# Patient Record
Sex: FEMALE | Race: WHITE | NOT HISPANIC OR LATINO | Employment: UNEMPLOYED | URBAN - METROPOLITAN AREA
[De-identification: names, ages, dates, MRNs, and addresses within clinical notes are randomized per-mention and may not be internally consistent; named-entity substitution may affect disease eponyms.]

---

## 2020-02-10 ENCOUNTER — OFFICE VISIT CONVERTED (OUTPATIENT)
Dept: FAMILY MEDICINE CLINIC | Facility: CLINIC | Age: 55
End: 2020-02-10
Attending: FAMILY MEDICINE

## 2020-02-10 ENCOUNTER — CONVERSION ENCOUNTER (OUTPATIENT)
Dept: FAMILY MEDICINE CLINIC | Facility: CLINIC | Age: 55
End: 2020-02-10

## 2020-02-27 ENCOUNTER — OFFICE VISIT CONVERTED (OUTPATIENT)
Dept: FAMILY MEDICINE CLINIC | Facility: CLINIC | Age: 55
End: 2020-02-27
Attending: NURSE PRACTITIONER

## 2021-05-06 ENCOUNTER — OFFICE VISIT CONVERTED (OUTPATIENT)
Dept: FAMILY MEDICINE CLINIC | Facility: CLINIC | Age: 56
End: 2021-05-06
Attending: NURSE PRACTITIONER

## 2021-05-06 ENCOUNTER — HOSPITAL ENCOUNTER (OUTPATIENT)
Dept: FAMILY MEDICINE CLINIC | Facility: CLINIC | Age: 56
Discharge: HOME | End: 2021-05-06
Attending: NURSE PRACTITIONER

## 2021-05-06 ENCOUNTER — CONVERSION ENCOUNTER (OUTPATIENT)
Dept: FAMILY MEDICINE CLINIC | Facility: CLINIC | Age: 56
End: 2021-05-06

## 2021-05-06 LAB
ALBUMIN SERPL-MCNC: 4.7 G/DL (ref 3.5–5)
ALBUMIN/GLOB SERPL: 1.5 {RATIO} (ref 1.4–2.6)
ALP SERPL-CCNC: 96 U/L (ref 53–141)
ALT SERPL-CCNC: 11 U/L (ref 10–40)
AMYLASE SERPL-CCNC: 49 U/L (ref 30–110)
ANION GAP SERPL CALC-SCNC: 18 MMOL/L (ref 8–19)
AST SERPL-CCNC: 16 U/L (ref 15–50)
BASOPHILS # BLD AUTO: 0.05 10*3/UL (ref 0–0.2)
BASOPHILS NFR BLD AUTO: 0.6 % (ref 0–3)
BILIRUB SERPL-MCNC: 0.24 MG/DL (ref 0.2–1.3)
BUN SERPL-MCNC: 6 MG/DL (ref 5–25)
BUN/CREAT SERPL: 7 {RATIO} (ref 6–20)
CALCIUM SERPL-MCNC: 9.7 MG/DL (ref 8.7–10.4)
CHLORIDE SERPL-SCNC: 96 MMOL/L (ref 99–111)
CONV ABS IMM GRAN: 0.03 10*3/UL (ref 0–0.2)
CONV CO2: 25 MMOL/L (ref 22–32)
CONV IMMATURE GRAN: 0.4 % (ref 0–1.8)
CONV TOTAL PROTEIN: 7.8 G/DL (ref 6.3–8.2)
CREAT UR-MCNC: 0.82 MG/DL (ref 0.5–0.9)
DEPRECATED RDW RBC AUTO: 38.8 FL (ref 36.4–46.3)
EOSINOPHIL # BLD AUTO: 0.1 10*3/UL (ref 0–0.7)
EOSINOPHIL # BLD AUTO: 1.2 % (ref 0–7)
ERYTHROCYTE [DISTWIDTH] IN BLOOD BY AUTOMATED COUNT: 12.2 % (ref 11.7–14.4)
GFR SERPLBLD BASED ON 1.73 SQ M-ARVRAT: >60 ML/MIN/{1.73_M2}
GLOBULIN UR ELPH-MCNC: 3.1 G/DL (ref 2–3.5)
GLUCOSE SERPL-MCNC: 84 MG/DL (ref 65–99)
HCT VFR BLD AUTO: 42.7 % (ref 37–47)
HGB BLD-MCNC: 14.2 G/DL (ref 12–16)
LIPASE SERPL-CCNC: 30 U/L (ref 5–51)
LYMPHOCYTES # BLD AUTO: 1.91 10*3/UL (ref 1–5)
LYMPHOCYTES NFR BLD AUTO: 23.8 % (ref 20–45)
MCH RBC QN AUTO: 28.9 PG (ref 27–31)
MCHC RBC AUTO-ENTMCNC: 33.3 G/DL (ref 33–37)
MCV RBC AUTO: 87 FL (ref 81–99)
MONOCYTES # BLD AUTO: 0.52 10*3/UL (ref 0.2–1.2)
MONOCYTES NFR BLD AUTO: 6.5 % (ref 3–10)
NEUTROPHILS # BLD AUTO: 5.4 10*3/UL (ref 2–8)
NEUTROPHILS NFR BLD AUTO: 67.5 % (ref 30–85)
NRBC CBCN: 0 % (ref 0–0.7)
OSMOLALITY SERPL CALC.SUM OF ELEC: 277 MOSM/KG (ref 273–304)
PLATELET # BLD AUTO: 342 10*3/UL (ref 130–400)
PMV BLD AUTO: 9.9 FL (ref 9.4–12.3)
POTASSIUM SERPL-SCNC: 4.3 MMOL/L (ref 3.5–5.3)
RBC # BLD AUTO: 4.91 10*6/UL (ref 4.2–5.4)
SODIUM SERPL-SCNC: 135 MMOL/L (ref 135–147)
WBC # BLD AUTO: 8.01 10*3/UL (ref 4.8–10.8)

## 2021-05-09 VITALS
OXYGEN SATURATION: 98 % | HEART RATE: 118 BPM | DIASTOLIC BLOOD PRESSURE: 84 MMHG | WEIGHT: 149 LBS | TEMPERATURE: 97 F | SYSTOLIC BLOOD PRESSURE: 130 MMHG

## 2021-05-09 VITALS
DIASTOLIC BLOOD PRESSURE: 62 MMHG | SYSTOLIC BLOOD PRESSURE: 124 MMHG | WEIGHT: 147.12 LBS | HEIGHT: 62 IN | BODY MASS INDEX: 27.07 KG/M2 | HEART RATE: 102 BPM | TEMPERATURE: 98.2 F | OXYGEN SATURATION: 97 %

## 2021-05-12 ENCOUNTER — HOSPITAL ENCOUNTER (OUTPATIENT)
Dept: OTHER | Facility: HOSPITAL | Age: 56
Discharge: HOME | End: 2021-05-12
Attending: NURSE PRACTITIONER

## 2021-05-23 ENCOUNTER — TRANSCRIBE ORDERS (OUTPATIENT)
Dept: ADMINISTRATIVE | Facility: HOSPITAL | Age: 56
End: 2021-05-23

## 2021-05-23 DIAGNOSIS — K82.8 BILIARY DYSKINESIA: ICD-10-CM

## 2021-05-23 DIAGNOSIS — R10.9 RIGHT LATERAL ABDOMINAL PAIN: Primary | ICD-10-CM

## 2021-06-05 NOTE — PROGRESS NOTES
Progress Note      Patient Name: Urszula Goodwin   Patient ID: [id number]   Sex: Female   YOB: 1965    Referring Provider: Yanira CALDERON    Visit Date: May 6, 2021    Provider: YUMIKO Mckeon   Location: [facility]   Location Address: [address] [place] [address] [address] [place], [place]  [address]   Location Phone: [phone]          Chief Complaint     Right mid abdomen/back pain x four weeks.       History Of Present Illness  Urszula Goodwin is a 56 year old /White female who presents for evaluation and treatment of:      right middle abdominal and right back pain x 4 weeks - pain in the abdomen and shoot toward the back - originally thought it a pulled muscle but then noted worse after eating a large meal - eating only soup this past week - when eats a meal has difficulty turning to the right due to pain - stool is soft, her normal, drinks lots of decaf coffee - denies pain in right shoulder or shoulder blade           Past Medical History  Disease Name Date Onset Notes   Alcohol abuse --  --    Seasonal allergies --  --          Medication List  Name Date Started Instructions   Flonase Allergy Relief 50 mcg/actuation nasal spray,suspension  spray 1 spray (50 mcg) in each nostril by intranasal route once daily   Zyrtec 10 mg oral capsule  1 QD         Allergy List  Allergen Name Date Reaction Notes   NO KNOWN DRUG ALLERGIES --  --  --          Family Medical History  Disease Name Relative/Age Notes   DM Type II Brother/  Father/   Father; Brother   DM Type I Father/   Father   Hyperlipidemia Father/   Father   Arthrtis Grandmother (paternal)/   Grandmother (paternal)   Alcohol abuse Brother/   Brother         Social History  Finding Status Start/Stop Quantity Notes   Alcohol Current every day --/-- --  drinks alcohol, 7 or less drinks per week   Exercises regularly --  --/-- --  occasionally   Recreational Drug Use Never --/-- --  never used   Second hand smoke exposure  Unknown --/-- --  yes   Tobacco Current every day --/-- 1.5- 2 PPD current every day smoker, smokes 1 to 2 packs per day, for 25 years, never uses other tobacco products   Uses seatbelts --  --/-- --  yes         Review of Systems  · Constitutional  o Denies  o : fever, chills, body aches  · Cardiovascular  o Denies  o : chest pain, palpitations  · Respiratory  o Denies  o : shortness of breath, wheezing, cough  · Gastrointestinal  o Admits  o : excessive belching, excessive flatulence, bloating  o Denies  o : nausea, vomiting, diarrhea  · Allergic-Immunologic  o Admits  o : sinus allergy symptoms      Vitals  Date Time BP Position Site L\R Cuff Size HR RR TEMP (F) WT  HT  BMI kg/m2 BSA m2 O2 Sat FR L/min FiO2 HC       05/06/2021 01:08 /89 Sitting    79 - R  97.1 147lbs 16oz    96 %            Physical Examination  · Constitutional  o Appearance  o : well developed, well-nourished, in no acute distress  · Eyes  o Conjunctivae  o : conjunctivae normal  · Neck  o Inspection/Palpation  o : supple  o Thyroid  o : no thyromegaly  · Respiratory  o Respiratory Effort  o : breathing unlabored  o Auscultation of Lungs  o : clear to ascultation  · Cardiovascular  o Heart  o :   § Auscultation of Heart  § : regular rate and rhythm  o Peripheral Vascular System  o :   § Extremities  § : no edema  · Gastrointestinal  o Abdominal Examination  o :   § Abdomen  § : bowel sounds present, non-distended, non-tender, negative rebound tenderness, mild right flank discomfort with percussion  · Lymphatic  o Neck  o : no lymphadenopathy present  · Musculoskeletal  o General  o :   § General Musculoskeletal  § : Muscle tone, strength, and development grossly normal.  · Skin and Subcutaneous Tissue  o General Inspection  o : NL tone  · Neurologic  o Gait and Station  o :   § Gait Screening  § : normal gait  · Psychiatric  o Mood and Affect  o : mood normal, affect appropriate              Assessment  · Abdominal  pain     789.00/R10.9  · Flank pain     789.09/R10.9      Plan  · Orders  o Amylase and lipase panel (62314, 37617) - 789.00/R10.9 - 05/06/2021  o CBC with Auto Diff [facility] (10753) - 789.00/R10.9 - 05/06/2021  o CMP [facility] (81437) - 789.00/R10.9 - 05/06/2021  o Gallbladder U/S (30046) - 789.00/R10.9 - 05/06/2021   RUQ pain and Right flank pain include kidney if possible  o IOP - Urinalysis without Microscopy (Clinitek) [facility] (51350) - 789.00/R10.9, 789.09/R10.9 - 05/06/2021   glu neg, elie small, ket 15mg, blo neg, ph 6.0, pro 30mg, uro 1.0, nit neg, leuk neg  o ACO-39: Current medications updated and reviewed (1159F, ) - - 05/06/2021  · Medications  o Zyrtec-D 5-120 mg oral tablet extended release 12 hr   SIG: take 1 tablet by oral route every 12 hours for 30 days   DISP: (60) Tablet with 1 refills  Prescribed on 05/06/2021     o Medications have been Reconciled  o Transition of Care or Provider Policy  · Instructions  o Instructed to seek medical attention urgently for new or worsening symptoms.  o Patient was educated/instructed on their diagnosis, treatment and medications prior to discharge from the clinic today.            Electronically Signed by: YUMIKO Mckeon -Author on May 7, 2021 11:07:11 AM

## 2021-06-14 ENCOUNTER — HOSPITAL ENCOUNTER (OUTPATIENT)
Dept: NUCLEAR MEDICINE | Facility: HOSPITAL | Age: 56
Discharge: HOME | End: 2021-06-14
Admitting: NURSE PRACTITIONER

## 2021-06-14 DIAGNOSIS — R10.9 RIGHT LATERAL ABDOMINAL PAIN: ICD-10-CM

## 2021-06-14 PROCEDURE — 78227 HEPATOBIL SYST IMAGE W/DRUG: CPT

## 2021-06-14 PROCEDURE — 0 TECHNETIUM TC 99M MEBROFENIN KIT: Performed by: NURSE PRACTITIONER

## 2021-06-14 PROCEDURE — A9537 TC99M MEBROFENIN: HCPCS | Performed by: NURSE PRACTITIONER

## 2021-06-14 RX ORDER — KIT FOR THE PREPARATION OF TECHNETIUM TC 99M MEBROFENIN 45 MG/10ML
1 INJECTION, POWDER, LYOPHILIZED, FOR SOLUTION INTRAVENOUS
Status: COMPLETED | OUTPATIENT
Start: 2021-06-14 | End: 2021-06-14

## 2021-06-14 RX ADMIN — MEBROFENIN 1 DOSE: 45 INJECTION, POWDER, LYOPHILIZED, FOR SOLUTION INTRAVENOUS at 08:55

## 2021-07-15 VITALS
OXYGEN SATURATION: 96 % | SYSTOLIC BLOOD PRESSURE: 128 MMHG | DIASTOLIC BLOOD PRESSURE: 89 MMHG | TEMPERATURE: 97.1 F | BODY MASS INDEX: 27.07 KG/M2 | WEIGHT: 148 LBS | HEART RATE: 79 BPM

## 2021-07-20 ENCOUNTER — OFFICE VISIT (OUTPATIENT)
Dept: SURGERY | Facility: CLINIC | Age: 56
End: 2021-07-20

## 2021-07-20 VITALS — WEIGHT: 140.2 LBS | BODY MASS INDEX: 24.84 KG/M2 | RESPIRATION RATE: 16 BRPM | HEIGHT: 63 IN

## 2021-07-20 DIAGNOSIS — K82.8 BILIARY DYSKINESIA: Primary | ICD-10-CM

## 2021-07-20 PROCEDURE — 99213 OFFICE O/P EST LOW 20 MIN: CPT | Performed by: NURSE PRACTITIONER

## 2021-07-20 RX ORDER — TOBRAMYCIN AND DEXAMETHASONE 3; 1 MG/ML; MG/ML
1 SUSPENSION/ DROPS OPHTHALMIC
COMMUNITY

## 2021-07-20 RX ORDER — CETIRIZINE HYDROCHLORIDE 10 MG/1
CAPSULE, LIQUID FILLED ORAL
COMMUNITY

## 2021-07-20 RX ORDER — FLUTICASONE PROPIONATE 50 MCG
SPRAY, SUSPENSION (ML) NASAL
COMMUNITY

## 2021-07-20 NOTE — PROGRESS NOTES
Chief Complaint  Abdominal Pain    Subjective          Urszula Goodwin presents to [facility]  Patient is a 56-year-old white/ female that presents to general surgery office at the request of Eryn Xiong.    Patient reports back in early May she was with some right upper quadrant pain that radiates through to her back that was not affected by food.     Reported she had diarrhea immediately after eating.    Today she denies any pain at all.    PROCEDURE: ULTRASOUND, ABDOMEN COMPLETE         COMPARISON: None         INDICATIONS: ABD PAIN         TECHNIQUE: High resolution sonographic examination of the abdomen was performed.           FINDINGS:     LIVER: Normal.  Normal size and echotexture.  No significant masses.    BILIARY: Normal.  Normal appearing gallbladder and biliary tree.      PANCREAS: Normal.  No visible mass, abnormal atrophy, or ductal dilatation.      SPLEEN: Normal.  Normal size and echotexture.      KIDNEYS: Normal.  No mass or obstruction.      AORTA/VASCULAR: Normal.  No aneurysm.      OTHER: Negative.           CONCLUSION: Normal examination.            JESE MCKEON MD                 PROCEDURE:  NM HIDA SCAN WITH PHARMACOLOGICAL INTERVENTION     COMPARISON: [facility], US, US ABDOMEN COMPLETE, 5/12/2021, 10:24.     INDICATIONS:  RIGHT ABD PAIN     TECHNIQUE:    Informed consent was obtained. A routine radionuclide hepatobiliary scan was performed   after intravenous injection of radiopharmaceutical Tc-99 JACINTO derivative with sequential   acquisitions every 5 minutes for one hour. Then, a repeat hepatobiliary scan with gallbladder   ejection fraction analysis was performed after an 8 ounce can of Ensure was ingested orally.      RADIONUCLIDE:         5.2 mCi    Tc99m Mebrofenin - I.V.     FINDINGS:          BILIARY DUCTS:           Normal radioisotopic biliary excretion.    GALLBLADDER:            Normal with no  "evidence of cystic duct obstruction.    INTESTINE:       Normal with no evidence of common biliary ductal obstruction.    EJECTION FRACTION: The gallbladder was difficult to visualize following administration of Ensure to   assess gallbladder contraction.  Activity in the right upper quadrant resulted in calculation of   ejection fraction of 10%, although this could be falsely low due to activity within right upper   quadrant bowel loops.  OTHER:             Symptoms were not reproduced with evaluation of gallbladder ejection fraction.       CONCLUSION:   1. Gallbladder ejection fraction was calculated at 10% which is abnormal, and could indicate   biliary dyskinesia, however, the gallbladder was difficult to visualize on images for ejection   fraction calculation, and the ejection fraction may be falsely decreased due to activity in right   upper quadrant bowel loops.  Recommend further correlation with clinical findings of biliary   pathology.  2. Normal hepatobiliary scan excludes acute cholecystitis or cystic duct obstruction.  3. Symptoms were not reproduced on this exam.            GABRIELLE TATUM MD             Objective   Vital Signs:   Resp 16   Ht 160 cm (63\")   Wt 63.6 kg (140 lb 3.2 oz)   BMI 24.84 kg/m²     Physical Exam  Constitutional:       Appearance: Normal appearance.   Pulmonary:      Effort: Pulmonary effort is normal.   Abdominal:      General: Abdomen is flat.      Tenderness: There is abdominal tenderness.   Skin:     General: Skin is warm and dry.   Neurological:      General: No focal deficit present.      Mental Status: She is alert and oriented to person, place, and time.   Psychiatric:         Mood and Affect: Mood normal.         Behavior: Behavior normal.        Result Review :                 Assessment and Plan      Patient wishes to hold off on a laparoscopic cholecystectomy at this time.  She reports that she does not have any pain.  I have educated her she may start to encounter " gallbladder attacks frequently.  If and when she changes her mind she can notify the office and we will get her set up.      Diagnoses and all orders for this visit:    1. Biliary dyskinesia (Primary)        Follow Up   Return if symptoms worsen or fail to improve.     Patient was given instructions and counseling regarding her condition or for health maintenance advice. Please see specific information pulled into the AVS if appropriate.

## 2023-10-12 ENCOUNTER — APPOINTMENT (OUTPATIENT)
Dept: CT IMAGING | Facility: HOSPITAL | Age: 58
End: 2023-10-12
Payer: OTHER GOVERNMENT

## 2023-10-12 ENCOUNTER — HOSPITAL ENCOUNTER (EMERGENCY)
Facility: HOSPITAL | Age: 58
Discharge: HOME | End: 2023-10-12
Attending: EMERGENCY MEDICINE
Payer: OTHER GOVERNMENT

## 2023-10-12 VITALS
HEIGHT: 63 IN | BODY MASS INDEX: 22.89 KG/M2 | SYSTOLIC BLOOD PRESSURE: 96 MMHG | DIASTOLIC BLOOD PRESSURE: 63 MMHG | RESPIRATION RATE: 18 BRPM | TEMPERATURE: 98.6 F | WEIGHT: 129.19 LBS | HEART RATE: 76 BPM | OXYGEN SATURATION: 97 %

## 2023-10-12 DIAGNOSIS — K29.00 ACUTE GASTRITIS WITHOUT HEMORRHAGE, UNSPECIFIED GASTRITIS TYPE: Primary | ICD-10-CM

## 2023-10-12 LAB
ALBUMIN SERPL-MCNC: 5 G/DL (ref 3.5–5.2)
ALBUMIN/GLOB SERPL: 1.9 G/DL
ALP SERPL-CCNC: 87 U/L (ref 39–117)
ALT SERPL W P-5'-P-CCNC: 18 U/L (ref 1–33)
ANION GAP SERPL CALCULATED.3IONS-SCNC: 12.4 MMOL/L (ref 5–15)
AST SERPL-CCNC: 24 U/L (ref 1–32)
BACTERIA UR QL AUTO: NORMAL /HPF
BASOPHILS # BLD AUTO: 0.04 10*3/MM3 (ref 0–0.2)
BASOPHILS NFR BLD AUTO: 1.1 % (ref 0–1.5)
BILIRUB SERPL-MCNC: 0.2 MG/DL (ref 0–1.2)
BILIRUB UR QL STRIP: NEGATIVE
BUN SERPL-MCNC: 8 MG/DL (ref 6–20)
BUN/CREAT SERPL: 11 (ref 7–25)
CALCIUM SPEC-SCNC: 9.7 MG/DL (ref 8.6–10.5)
CHLORIDE SERPL-SCNC: 97 MMOL/L (ref 98–107)
CLARITY UR: CLEAR
CO2 SERPL-SCNC: 24.6 MMOL/L (ref 22–29)
COLOR UR: YELLOW
CREAT SERPL-MCNC: 0.73 MG/DL (ref 0.57–1)
D-LACTATE SERPL-SCNC: 1 MMOL/L (ref 0.5–2)
DEPRECATED RDW RBC AUTO: 39.3 FL (ref 37–54)
EGFRCR SERPLBLD CKD-EPI 2021: 96.1 ML/MIN/1.73
EOSINOPHIL # BLD AUTO: 0.04 10*3/MM3 (ref 0–0.4)
EOSINOPHIL NFR BLD AUTO: 1.1 % (ref 0.3–6.2)
ERYTHROCYTE [DISTWIDTH] IN BLOOD BY AUTOMATED COUNT: 12.3 % (ref 12.3–15.4)
GLOBULIN UR ELPH-MCNC: 2.6 GM/DL
GLUCOSE SERPL-MCNC: 93 MG/DL (ref 65–99)
GLUCOSE UR STRIP-MCNC: NEGATIVE MG/DL
HCT VFR BLD AUTO: 46.5 % (ref 34–46.6)
HGB BLD-MCNC: 15.7 G/DL (ref 12–15.9)
HGB UR QL STRIP.AUTO: ABNORMAL
HOLD SPECIMEN: NORMAL
HOLD SPECIMEN: NORMAL
HYALINE CASTS UR QL AUTO: NORMAL /LPF
IMM GRANULOCYTES # BLD AUTO: 0.01 10*3/MM3 (ref 0–0.05)
IMM GRANULOCYTES NFR BLD AUTO: 0.3 % (ref 0–0.5)
KETONES UR QL STRIP: ABNORMAL
LEUKOCYTE ESTERASE UR QL STRIP.AUTO: NEGATIVE
LIPASE SERPL-CCNC: 57 U/L (ref 13–60)
LYMPHOCYTES # BLD AUTO: 0.92 10*3/MM3 (ref 0.7–3.1)
LYMPHOCYTES NFR BLD AUTO: 24.2 % (ref 19.6–45.3)
MCH RBC QN AUTO: 29.3 PG (ref 26.6–33)
MCHC RBC AUTO-ENTMCNC: 33.8 G/DL (ref 31.5–35.7)
MCV RBC AUTO: 86.8 FL (ref 79–97)
MONOCYTES # BLD AUTO: 0.31 10*3/MM3 (ref 0.1–0.9)
MONOCYTES NFR BLD AUTO: 8.2 % (ref 5–12)
NEUTROPHILS NFR BLD AUTO: 2.48 10*3/MM3 (ref 1.7–7)
NEUTROPHILS NFR BLD AUTO: 65.1 % (ref 42.7–76)
NITRITE UR QL STRIP: NEGATIVE
NRBC BLD AUTO-RTO: 0 /100 WBC (ref 0–0.2)
PH UR STRIP.AUTO: 5.5 [PH] (ref 5–8)
PLATELET # BLD AUTO: 188 10*3/MM3 (ref 140–450)
PMV BLD AUTO: 10.4 FL (ref 6–12)
POTASSIUM SERPL-SCNC: 4.4 MMOL/L (ref 3.5–5.2)
PROT SERPL-MCNC: 7.6 G/DL (ref 6–8.5)
PROT UR QL STRIP: ABNORMAL
RBC # BLD AUTO: 5.36 10*6/MM3 (ref 3.77–5.28)
RBC # UR STRIP: NORMAL /HPF
REF LAB TEST METHOD: NORMAL
SODIUM SERPL-SCNC: 134 MMOL/L (ref 136–145)
SP GR UR STRIP: 1.01 (ref 1–1.03)
SQUAMOUS #/AREA URNS HPF: NORMAL /HPF
UROBILINOGEN UR QL STRIP: ABNORMAL
WBC # UR STRIP: NORMAL /HPF
WBC NRBC COR # BLD: 3.8 10*3/MM3 (ref 3.4–10.8)
WHOLE BLOOD HOLD COAG: NORMAL
WHOLE BLOOD HOLD SPECIMEN: NORMAL

## 2023-10-12 PROCEDURE — 81001 URINALYSIS AUTO W/SCOPE: CPT

## 2023-10-12 PROCEDURE — 83605 ASSAY OF LACTIC ACID: CPT

## 2023-10-12 PROCEDURE — 25010000002 ONDANSETRON PER 1 MG: Performed by: EMERGENCY MEDICINE

## 2023-10-12 PROCEDURE — 85025 COMPLETE CBC W/AUTO DIFF WBC: CPT

## 2023-10-12 PROCEDURE — 25510000001 IOPAMIDOL PER 1 ML: Performed by: EMERGENCY MEDICINE

## 2023-10-12 PROCEDURE — 99285 EMERGENCY DEPT VISIT HI MDM: CPT

## 2023-10-12 PROCEDURE — 25010000002 MORPHINE PER 10 MG: Performed by: EMERGENCY MEDICINE

## 2023-10-12 PROCEDURE — 96375 TX/PRO/DX INJ NEW DRUG ADDON: CPT

## 2023-10-12 PROCEDURE — 83690 ASSAY OF LIPASE: CPT

## 2023-10-12 PROCEDURE — 80053 COMPREHEN METABOLIC PANEL: CPT

## 2023-10-12 PROCEDURE — 96374 THER/PROPH/DIAG INJ IV PUSH: CPT

## 2023-10-12 PROCEDURE — 74177 CT ABD & PELVIS W/CONTRAST: CPT

## 2023-10-12 PROCEDURE — 36415 COLL VENOUS BLD VENIPUNCTURE: CPT

## 2023-10-12 RX ORDER — ONDANSETRON 2 MG/ML
4 INJECTION INTRAMUSCULAR; INTRAVENOUS ONCE
Status: COMPLETED | OUTPATIENT
Start: 2023-10-12 | End: 2023-10-12

## 2023-10-12 RX ORDER — SODIUM CHLORIDE 0.9 % (FLUSH) 0.9 %
10 SYRINGE (ML) INJECTION AS NEEDED
Status: DISCONTINUED | OUTPATIENT
Start: 2023-10-12 | End: 2023-10-12 | Stop reason: HOSPADM

## 2023-10-12 RX ADMIN — MORPHINE SULFATE 4 MG: 4 INJECTION, SOLUTION INTRAMUSCULAR; INTRAVENOUS at 18:52

## 2023-10-12 RX ADMIN — ONDANSETRON 4 MG: 2 INJECTION INTRAMUSCULAR; INTRAVENOUS at 18:52

## 2023-10-12 RX ADMIN — IOPAMIDOL 75 ML: 755 INJECTION, SOLUTION INTRAVENOUS at 19:42

## 2023-10-12 NOTE — ED PROVIDER NOTES
Time: 6:09 PM EDT  Date of encounter:  10/12/2023  Independent Historian/Clinical History and Information was obtained by:   Patient    History is limited by: N/A    Chief Complaint: Abdominal pain      History of Present Illness:  Patient is a 58 y.o. year old female who presents to the emergency department for evaluation of abdominal pain.  Patient states that this week she was diagnosed with the flu and then today she began having right upper quadrant abdominal pain.  Patient states the pain radiates from the right upper quadrant to her back.  Patient states she has been told in the past that her gallbladder function is low.  Patient states she was scheduled to have cholecystectomy but at the time that was scheduled she was not having pain any longer so she did not have it done.  Patient states today she had nausea and vomiting.  Patient denies fever, vomiting, dysuria, chest pain, shortness of breath.    HPI    Patient Care Team  Primary Care Provider: Rachel Kessler APRN    Past Medical History:     No Known Allergies  History reviewed. No pertinent past medical history.  History reviewed. No pertinent surgical history.  History reviewed. No pertinent family history.    Home Medications:  Prior to Admission medications    Medication Sig Start Date End Date Taking? Authorizing Provider   Cetirizine HCl (ZyrTEC Allergy) 10 MG capsule Zyrtec 10 mg oral capsule 1 QD   Active   Yes ProviderCarloz MD   Cetirizine-Pseudoephedrine (WAL-ZYR D PO) TAKE ONE TABLET BY MOUTH EVERY 12 HOURS  Patient not taking: Reported on 10/12/2023 5/6/21   Carloz Barlow MD   fluticasone (Flonase) 50 MCG/ACT nasal spray Flonase Allergy Relief 50 mcg/actuation nasal spray,suspension spray 1 spray (50 mcg) in each nostril by intranasal route once daily   Active  Patient not taking: Reported on 10/12/2023    ProviderCarloz MD   tobramycin-dexamethasone (TOBRADEX) 0.3-0.1 % ophthalmic suspension 1 drop.  Patient  "not taking: Reported on 10/12/2023    Provider, MD Carloz        Social History:   Social History     Tobacco Use    Smoking status: Every Day     Packs/day: 1.5     Types: Cigarettes    Smokeless tobacco: Never   Substance Use Topics    Alcohol use: Not Currently         Review of Systems:  Review of Systems   Constitutional:  Negative for fever.   Respiratory:  Negative for shortness of breath.    Cardiovascular:  Negative for chest pain.   Gastrointestinal:  Positive for abdominal pain, diarrhea and nausea. Negative for vomiting.   Genitourinary:  Negative for dysuria.        Physical Exam:  BP 96/63   Pulse 76   Temp 98.6 øF (37 øC)   Resp 18   Ht 160 cm (63\")   Wt 58.6 kg (129 lb 3 oz)   SpO2 97%   BMI 22.88 kg/mý     Physical Exam  Vitals and nursing note reviewed.   Constitutional:       General: She is not in acute distress.     Appearance: Normal appearance. She is well-developed and normal weight. She is not ill-appearing, toxic-appearing or diaphoretic.   HENT:      Head: Normocephalic and atraumatic.      Nose: Nose normal.   Eyes:      Extraocular Movements: Extraocular movements intact.      Conjunctiva/sclera: Conjunctivae normal.      Pupils: Pupils are equal, round, and reactive to light.   Cardiovascular:      Rate and Rhythm: Normal rate and regular rhythm.      Heart sounds: Normal heart sounds.   Pulmonary:      Effort: Pulmonary effort is normal.      Breath sounds: Normal breath sounds.   Abdominal:      General: Abdomen is flat. Bowel sounds are normal. There is no distension.      Palpations: Abdomen is soft. There is no hepatomegaly, splenomegaly, mass or pulsatile mass.      Tenderness: There is abdominal tenderness in the right upper quadrant. There is no guarding or rebound. Negative signs include Millan's sign and McBurney's sign.      Hernia: No hernia is present.   Musculoskeletal:         General: Normal range of motion.      Cervical back: Normal range of motion and neck " supple.   Skin:     General: Skin is warm and dry.   Neurological:      General: No focal deficit present.      Mental Status: She is alert and oriented to person, place, and time.   Psychiatric:         Mood and Affect: Mood normal.         Behavior: Behavior normal.         Thought Content: Thought content normal.         Judgment: Judgment normal.                Procedures:  Procedures      Medical Decision Making:    Comorbidities that affect care:    None    External Notes reviewed:    Previous Clinic Note: General surgery office visit from 7- where patient was diagnosed with biliary dyskinesia after HIDA scan showing function of 10%      The following orders were placed and all results were independently analyzed by me:  Orders Placed This Encounter   Procedures    CT Abdomen Pelvis With Contrast    [place] Draw    Comprehensive Metabolic Panel    Lipase    Urinalysis With Microscopic If Indicated (No Culture) - Urine, Clean Catch    Lactic Acid, Plasma    CBC Auto Differential    Urinalysis, Microscopic Only - Urine, Clean Catch    NPO Diet NPO Type: Strict NPO    Undress & Gown    Insert Peripheral IV    CBC & Differential    Green Top (Gel)    Lavender Top    Gold Top - SST    Light Blue Top       Medications Given in the Emergency Department:  Medications   sodium chloride 0.9 % flush 10 mL (has no administration in time range)   morphine injection 4 mg (4 mg Intravenous Given 10/12/23 1852)   ondansetron (ZOFRAN) injection 4 mg (4 mg Intravenous Given 10/12/23 1852)   iopamidol (ISOVUE-370) 76 % injection 100 mL (75 mL Intravenous Given 10/12/23 1942)        ED Course:    ED Course as of 10/12/23 2137   Thu Oct 12, 2023   2123 CT Abdomen Pelvis With Contrast  1. Mild prominence of the gastric thickness and mucosal enhancement.  Findings may be artifactual   or related underlying gastritis.  Please correlate clinically  2. Otherwise unremarkable study [MD]      ED Course User Index  [MD] Boy  James OWEN PA-C       Labs:    Lab Results (last 24 hours)       Procedure Component Value Units Date/Time    CBC & Differential [[id number]]  (Abnormal) Collected: 10/12/23 1752    Specimen: Blood Updated: 10/12/23 1811    Narrative:      The following orders were created for panel order CBC & Differential.  Procedure                               Abnormality         Status                     ---------                               -----------         ------                     CBC Auto Differential[[id number]]        Abnormal            Final result                 Please view results for these tests on the individual orders.    Comprehensive Metabolic Panel [[id number]]  (Abnormal) Collected: 10/12/23 1752    Specimen: Blood Updated: 10/12/23 1836     Glucose 93 mg/dL      BUN 8 mg/dL      Creatinine 0.73 mg/dL      Sodium 134 mmol/L      Potassium 4.4 mmol/L      Chloride 97 mmol/L      CO2 24.6 mmol/L      Calcium 9.7 mg/dL      Total Protein 7.6 g/dL      Albumin 5.0 g/dL      ALT (SGPT) 18 U/L      AST (SGOT) 24 U/L      Alkaline Phosphatase 87 U/L      Total Bilirubin 0.2 mg/dL      Globulin 2.6 gm/dL      A/G Ratio 1.9 g/dL      BUN/Creatinine Ratio 11.0     Anion Gap 12.4 mmol/L      eGFR 96.1 mL/min/1.73     Narrative:      GFR Normal >60  Chronic Kidney Disease <60  Kidney Failure <15      Lipase [[id number]]  (Normal) Collected: 10/12/23 1752    Specimen: Blood Updated: 10/12/23 1836     Lipase 57 U/L     Lactic Acid, Plasma [[id number]]  (Normal) Collected: 10/12/23 1752    Specimen: Blood Updated: 10/12/23 1826     Lactate 1.0 mmol/L     CBC Auto Differential [[id number]]  (Abnormal) Collected: 10/12/23 1752    Specimen: Blood Updated: 10/12/23 1811     WBC 3.80 10*3/mm3      RBC 5.36 10*6/mm3      Hemoglobin 15.7 g/dL      Hematocrit 46.5 %      MCV 86.8 fL      MCH 29.3 pg      MCHC 33.8 g/dL      RDW 12.3 %      RDW-SD 39.3 fl      MPV 10.4 fL      Platelets 188 10*3/mm3      Neutrophil % 65.1 %       Lymphocyte % 24.2 %      Monocyte % 8.2 %      Eosinophil % 1.1 %      Basophil % 1.1 %      Immature Grans % 0.3 %      Neutrophils, Absolute 2.48 10*3/mm3      Lymphocytes, Absolute 0.92 10*3/mm3      Monocytes, Absolute 0.31 10*3/mm3      Eosinophils, Absolute 0.04 10*3/mm3      Basophils, Absolute 0.04 10*3/mm3      Immature Grans, Absolute 0.01 10*3/mm3      nRBC 0.0 /100 WBC     Urinalysis With Microscopic If Indicated (No Culture) - Urine, Clean Catch [[id number]]  (Abnormal) Collected: 10/12/23 1843    Specimen: Urine, Clean Catch Updated: 10/12/23 1926     Color, UA Yellow     Appearance, UA Clear     pH, UA 5.5     Specific Gravity, UA 1.011     Glucose, UA Negative     Ketones, UA Trace     Bilirubin, UA Negative     Blood, UA Trace     Protein, UA Trace     Leuk Esterase, UA Negative     Nitrite, UA Negative     Urobilinogen, UA 0.2 E.U./dL    Urinalysis, Microscopic Only - Urine, Clean Catch [[id number]] Collected: 10/12/23 1843    Specimen: Urine, Clean Catch Updated: 10/12/23 1926     RBC, UA 0-2 /HPF      WBC, UA 0-2 /HPF      Bacteria, UA None Seen /HPF      Squamous Epithelial Cells, UA 0-2 /HPF      Hyaline Casts, UA 0-2 /LPF      Methodology Automated Microscopy             Imaging:    CT Abdomen Pelvis With Contrast    Result Date: 10/12/2023  PROCEDURE: CT ABDOMEN PELVIS W CONTRAST  COMPARISON: None  INDICATIONS: RIGHT UPPER ABDOMINAL PAIN X TODAY  TECHNIQUE: After obtaining the patient's consent, CT images were created with non-ionic intravenous contrast material.   PROTOCOL:   Standard imaging protocol performed    RADIATION:   DLP: 333 mGy*cm   Automated exposure control was utilized to minimize radiation dose. CONTRAST: 75 cc Isovue 370 I.V. LABS:   eGFR: 96.1 ml/min/1.73m2  FINDINGS:    Lung bases:  Emphysematous changes are noted at the lung bases.  There is evidence of old granulomatous disease with a calcified granuloma at the right base.  No free air is noted below the diaphragm.   Organs:  The liver, gallbladder, spleen, pancreas, kidneys and adrenal glands are grossly unremarkable in appearance.  GI tract:  Mild prominence of the enhancement in thickness of the gastric mucosa which could be artifactual or secondary to underlying gastritis.  GI tract is otherwise unremarkable in appearance.  No suspicious mesenteric adenopathy or fluid collection noted.  Pelvis:  Bladder is incompletely distended and grossly unremarkable in appearance.  Uterus is grossly unremarkable in appearance.  Mild prominence of the pelvic vascularity is noted which can be seen with pelvic congestion syndrome.  Ovaries are not well visualized but appear to be grossly unremarkable in appearance  Retroperitoneum:  Atherosclerotic changes are noted of the aorta which is normal in caliber.  There is no suspicious retroperitoneal adenopathy  Bones and soft tissues:  Multilevel degenerative changes are noted of the spine.  Grade 1 anterolisthesis of L4 on L5 noted with loss of disc space height and mild degenerative change.  No destructive bone lesion is noted.        1. Mild prominence of the gastric thickness and mucosal enhancement.  Findings may be artifactual or related underlying gastritis.  Please correlate clinically 2. Otherwise unremarkable study     LINDA RIVER MD       Electronically Signed and Approved By: LINDA RIVER MD on 10/12/2023 at 21:19                Differential Diagnosis and Discussion:    Abdominal Pain: Based on the patient's signs and symptoms, I considered abdominal aortic aneurysm, small bowel obstruction, pancreatitis, acute cholecystitis, acute appendecitis, peptic ulcer disease, gastritis, colitis, endocrine disorders, irritable bowel syndrome and other differential diagnosis an etiology of the patient's abdominal pain.    All labs were reviewed and interpreted by me.  CT scan radiology impression was interpreted by me.    MDM  Number of Diagnoses or Management Options  Acute gastritis  without hemorrhage, unspecified gastritis type  Diagnosis management comments: Patient presented to the emergency department today for evaluation of right upper quadrant abdominal pain.  CBC has normal white blood cell count.  CMP does note mildly decreased sodium of 130.  Lipase unremarkable.  Urinalysis negative for acute UTI.  CT abdomen pelvis notes possible acute gastritis but no other acute findings.  Did discuss these results with patient and she does not want treatment for this as she believes that this may be related to her current diagnosis of influenza.  I did discuss with patient that no provided the number for general surgery due to previous HIDA scan and current symptoms.       Amount and/or Complexity of Data Reviewed  Clinical lab tests: reviewed and ordered  Tests in the radiology section of [facility]: reviewed and ordered    Risk of Complications, Morbidity, and/or Mortality  Presenting problems: moderate  Diagnostic procedures: moderate  Management options: low    Patient Progress  Patient progress: stable       Consultants/Shared Management Plan:    None    Social Determinants of Health:    Patient is independent, reliable, and has access to care.       Disposition and Care Coordination:    Discharged: The patient is suitable and stable for discharge with no need for consideration of observation or admission.    I have explained the patient's condition, diagnoses and treatment plan based on the information available to me at this time. I have answered questions and addressed any concerns. The patient has a good  understanding of the patient's diagnosis, condition, and treatment plan as can be expected at this point. The vital signs have been stable. The patient's condition is stable and appropriate for discharge from the emergency department.      The patient will pursue further outpatient evaluation with the primary care physician or other designated or consulting physician as outlined in the  discharge instructions. They are agreeable to this plan of care and follow-up instructions have been explained in detail. The patient has received these instructions in written format and have expressed an understanding of the discharge instructions. The patient is aware that any significant change in condition or worsening of symptoms should prompt an immediate return to this or the closest emergency department or call to 911.  I have explained discharge medications and the need for follow up with the patient/caretakers. This was also printed in the discharge instructions. Patient was discharged with the following medications and follow up:      Medication List      No changes were made to your prescriptions during this visit.      Michael Laurent MD  [address] [facility]  [place] [address]  [phone]             Final diagnoses:   Acute gastritis without hemorrhage, unspecified gastritis type        ED Disposition       ED Disposition   Discharge    Condition   Stable    Comment   --               This medical record created using voice recognition software.             James Brunson PA-C  10/12/23 1460

## 2023-10-13 NOTE — DISCHARGE INSTRUCTIONS
Your labs completed in the emergency department today were normal.  There possible gastritis on your CT scan but your gallbladder looks normal today.  Due to the pain that you are describing and a HIDA scan showing impressive function I am providing you the number for general surgery so that you can follow-up with them again to rediscuss cholecystectomy as you had planned previously.  Avoid NSAIDs and spicy/fatty foods that may worsen her symptoms.